# Patient Record
Sex: MALE | URBAN - METROPOLITAN AREA
[De-identification: names, ages, dates, MRNs, and addresses within clinical notes are randomized per-mention and may not be internally consistent; named-entity substitution may affect disease eponyms.]

---

## 2017-05-13 ENCOUNTER — EMERGENCY (EMERGENCY)
Facility: HOSPITAL | Age: 22
LOS: 1 days | Discharge: HOME | End: 2017-05-13
Attending: EMERGENCY MEDICINE | Admitting: EMERGENCY MEDICINE
Payer: COMMERCIAL

## 2017-05-13 VITALS
TEMPERATURE: 97 F | WEIGHT: 164.91 LBS | RESPIRATION RATE: 18 BRPM | HEART RATE: 66 BPM | DIASTOLIC BLOOD PRESSURE: 76 MMHG | OXYGEN SATURATION: 99 % | SYSTOLIC BLOOD PRESSURE: 121 MMHG

## 2017-05-13 VITALS
SYSTOLIC BLOOD PRESSURE: 135 MMHG | OXYGEN SATURATION: 100 % | RESPIRATION RATE: 18 BRPM | DIASTOLIC BLOOD PRESSURE: 81 MMHG | HEART RATE: 67 BPM

## 2017-05-13 DIAGNOSIS — S02.2XXA FRACTURE OF NASAL BONES, INITIAL ENCOUNTER FOR CLOSED FRACTURE: ICD-10-CM
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
## 2017-05-13 DIAGNOSIS — S01.21XA LACERATION WITHOUT FOREIGN BODY OF NOSE, INITIAL ENCOUNTER: ICD-10-CM

## 2017-05-13 DIAGNOSIS — R55 SYNCOPE AND COLLAPSE: ICD-10-CM
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
## 2017-05-13 LAB
ANION GAP SERPL CALC-SCNC: 5 MMOL/L — LOW (ref 9–16)
BASOPHILS NFR BLD AUTO: 0.5 % — SIGNIFICANT CHANGE UP (ref 0–2)
BUN SERPL-MCNC: 14 MG/DL — SIGNIFICANT CHANGE UP (ref 7–23)
CALCIUM SERPL-MCNC: 8.6 MG/DL — SIGNIFICANT CHANGE UP (ref 8.5–10.5)
CHLORIDE SERPL-SCNC: 106 MMOL/L — SIGNIFICANT CHANGE UP (ref 96–108)
CO2 SERPL-SCNC: 28 MMOL/L — SIGNIFICANT CHANGE UP (ref 22–31)
CREAT SERPL-MCNC: 1 MG/DL — SIGNIFICANT CHANGE UP (ref 0.5–1.3)
EOSINOPHIL NFR BLD AUTO: 1.4 % — SIGNIFICANT CHANGE UP (ref 0–6)
GLUCOSE SERPL-MCNC: 100 MG/DL — HIGH (ref 70–99)
HCT VFR BLD CALC: 42.9 % — SIGNIFICANT CHANGE UP (ref 39–50)
HGB BLD-MCNC: 15.2 G/DL — SIGNIFICANT CHANGE UP (ref 13–17)
LYMPHOCYTES # BLD AUTO: 40.2 % — SIGNIFICANT CHANGE UP (ref 13–44)
MCHC RBC-ENTMCNC: 30.4 PG — SIGNIFICANT CHANGE UP (ref 27–34)
MCHC RBC-ENTMCNC: 35.4 G/DL — SIGNIFICANT CHANGE UP (ref 32–36)
MCV RBC AUTO: 85.8 FL — SIGNIFICANT CHANGE UP (ref 80–100)
MONOCYTES NFR BLD AUTO: 7.6 % — SIGNIFICANT CHANGE UP (ref 2–14)
NEUTROPHILS NFR BLD AUTO: 50.3 % — SIGNIFICANT CHANGE UP (ref 43–77)
PLATELET # BLD AUTO: 156 K/UL — SIGNIFICANT CHANGE UP (ref 150–400)
POTASSIUM SERPL-MCNC: 4 MMOL/L — SIGNIFICANT CHANGE UP (ref 3.5–5.3)
POTASSIUM SERPL-SCNC: 4 MMOL/L — SIGNIFICANT CHANGE UP (ref 3.5–5.3)
RBC # BLD: 5 M/UL — SIGNIFICANT CHANGE UP (ref 4.2–5.8)
RBC # FLD: 12.5 % — SIGNIFICANT CHANGE UP (ref 10.3–16.9)
SODIUM SERPL-SCNC: 139 MMOL/L — SIGNIFICANT CHANGE UP (ref 135–145)
WBC # BLD: 6.4 K/UL — SIGNIFICANT CHANGE UP (ref 3.8–10.5)
WBC # FLD AUTO: 6.4 K/UL — SIGNIFICANT CHANGE UP (ref 3.8–10.5)

## 2017-05-13 PROCEDURE — A9585: CPT

## 2017-05-13 PROCEDURE — 99285 EMERGENCY DEPT VISIT HI MDM: CPT | Mod: 25

## 2017-05-13 PROCEDURE — 70553 MRI BRAIN STEM W/O & W/DYE: CPT

## 2017-05-13 PROCEDURE — 99284 EMERGENCY DEPT VISIT MOD MDM: CPT | Mod: 25

## 2017-05-13 PROCEDURE — 70450 CT HEAD/BRAIN W/O DYE: CPT

## 2017-05-13 PROCEDURE — 84484 ASSAY OF TROPONIN QUANT: CPT

## 2017-05-13 PROCEDURE — 21310: CPT

## 2017-05-13 PROCEDURE — 72125 CT NECK SPINE W/O DYE: CPT

## 2017-05-13 PROCEDURE — 80048 BASIC METABOLIC PNL TOTAL CA: CPT

## 2017-05-13 PROCEDURE — 82550 ASSAY OF CK (CPK): CPT

## 2017-05-13 PROCEDURE — 93005 ELECTROCARDIOGRAM TRACING: CPT | Mod: XU

## 2017-05-13 PROCEDURE — 21310: CPT | Mod: XU

## 2017-05-13 PROCEDURE — 70450 CT HEAD/BRAIN W/O DYE: CPT | Mod: 26

## 2017-05-13 PROCEDURE — 72125 CT NECK SPINE W/O DYE: CPT | Mod: 26

## 2017-05-13 PROCEDURE — 70553 MRI BRAIN STEM W/O & W/DYE: CPT | Mod: 26

## 2017-05-13 PROCEDURE — 36415 COLL VENOUS BLD VENIPUNCTURE: CPT

## 2017-05-13 PROCEDURE — 13151 CMPLX RPR E/N/E/L 1.1-2.5 CM: CPT | Mod: XU

## 2017-05-13 PROCEDURE — 82553 CREATINE MB FRACTION: CPT

## 2017-05-13 PROCEDURE — 85025 COMPLETE CBC W/AUTO DIFF WBC: CPT

## 2017-05-13 PROCEDURE — 93010 ELECTROCARDIOGRAM REPORT: CPT | Mod: 59

## 2017-05-13 RX ORDER — SODIUM CHLORIDE 9 MG/ML
1000 INJECTION INTRAMUSCULAR; INTRAVENOUS; SUBCUTANEOUS ONCE
Qty: 0 | Refills: 0 | Status: COMPLETED | OUTPATIENT
Start: 2017-05-13 | End: 2017-05-13

## 2017-05-13 RX ORDER — CEPHALEXIN 500 MG
500 CAPSULE ORAL
Qty: 0 | Refills: 0 | Status: DISCONTINUED | OUTPATIENT
Start: 2017-05-13 | End: 2017-05-17

## 2017-05-13 RX ORDER — CEPHALEXIN 500 MG
1 CAPSULE ORAL
Qty: 28 | Refills: 0 | OUTPATIENT
Start: 2017-05-13 | End: 2017-05-20

## 2017-05-13 RX ORDER — ACETAMINOPHEN 500 MG
650 TABLET ORAL ONCE
Qty: 0 | Refills: 0 | Status: COMPLETED | OUTPATIENT
Start: 2017-05-13 | End: 2017-05-13

## 2017-05-13 RX ADMIN — SODIUM CHLORIDE 1000 MILLILITER(S): 9 INJECTION INTRAMUSCULAR; INTRAVENOUS; SUBCUTANEOUS at 11:33

## 2017-05-13 RX ADMIN — Medication 500 MILLIGRAM(S): at 13:06

## 2017-05-13 RX ADMIN — Medication 650 MILLIGRAM(S): at 13:37

## 2017-05-13 RX ADMIN — Medication 500 MILLIGRAM(S): at 18:09

## 2017-05-13 NOTE — PROGRESS NOTE ADULT - ATTENDING COMMENTS
Plan  complex repair nasal lacerations, 2 cm  CREF open nasal fracture    Duricef  Tylenol w/ codeine  RTO 5 d

## 2017-05-13 NOTE — PROGRESS NOTE ADULT - ASSESSMENT
PE- system specific  transversely oriented full thickness laceration nasal dorsum  full thickness through procerus down to fracture  measured 1.5 cm    vertical laceration full thickness left nasal dorsum  full thickness through procerus down to fracture  measured 0.5 cm    right nasal bone depression  no septal hematoma    CT- displaced nasal fracture

## 2017-05-13 NOTE — PROGRESS NOTE ADULT - PROBLEM SELECTOR PLAN 1
CT Head reviewed w/ Dr. Longoria,  No neurosurgical intervention,  Consider Neurology evaluation for syncope and to determine if further imaging I warraned

## 2017-05-13 NOTE — ED ADULT NURSE REASSESSMENT NOTE - NS ED NURSE REASSESS COMMENT FT1
Pt lab results rvwd.  Pt's radiology rvwd.  Pt c-spine precautions still in place.  Pt refusing analgesia at this time.  Pending f/u CT scan.  Will continue to reassess and reevaluate.

## 2017-05-13 NOTE — ED PROVIDER NOTE - OBJECTIVE STATEMENT
22 yo male with apparent syncopal event in CCU 1 hr ago-occurred  while visiting his grandfather - pos LOC  with facial deformity of nose- lac  x 2 noted   no N/V  slight headache - no antecedant CP or SOB-  no visual complaints-

## 2017-05-13 NOTE — ED ADULT NURSE REASSESSMENT NOTE - NS ED NURSE REASSESS COMMENT FT1
C-spine precautions lifted per MD.  Collar and backboard removed.  Pt without complaint.  Pending plastics consult.

## 2017-05-13 NOTE — ED PROVIDER NOTE - MEDICAL DECISION MAKING DETAILS
syncope -nasal bone fracture-  A/O  x 3-  inc RBBB-  d/w cards  no acute intervention or need to admit- ? left cerebellar mass - await MRI/with and without

## 2017-05-13 NOTE — ED ADULT NURSE NOTE - OBJECTIVE STATEMENT
Pt w/o significant PMH brought to ED by rapid response team from [facility] while visiting family member pt experienced syncopal event.  Pt denies tripping or slipping, states he felt faint and then woke up on the floor.  Pt arrives in C-collar for c-spine injury precautions and on back board, pending clearance by provider.  Pt's FS is 103.  Pt denies palpitations, CP, paresthesias, hx of seizure or epilepsy, or recent subjective illness.  Pt states he recently finished finals at [email or web address] and has been operating on limited sleep.  Admits to eating breakfast this morning, but denies fluid consumption.  Pt states normal bowel and bladder functionality.  Pt pending labs and radiologic scan.  Mother at bedside.

## 2017-05-13 NOTE — PROGRESS NOTE ADULT - SUBJECTIVE AND OBJECTIVE BOX
HISTORY OF PRESENT ILLNESS: 21 year old male with PMH of ADHD is s/p syncope and fall at family member's bedside today in our ICU. He sustained trauma to his face/nose. He denies any history of syncope previously but does report some family history of syncope. He also reports feeling emotional at that time. Currently he reports some pain at the site of trauma but otherwise denies headaches, visual or hearing changes, extremity weakness or numbness. He is in a hard cervical collar pending CT Cspine clearance. He denies any other  recent illnesses or complaints. Denies drug use, smoking or ETOH abuse. Takes medication for his ADHD. CT Head is concerning for a small ring-like lesion in the left cerebellum that could be artifact. No associated edema or mass effect.    PAST MEDICAL & SURGICAL HISTORY:   h/o ADHD    FAMILY HISTORY: unclear history of syncope      SOCIAL HISTORY:  Tobacco Use: Denies  EtOH use: Denies  Substance: Denies    Allergies    No Known Allergies    Intolerances        REVIEW OF SYSTEMS  No other pertinent ROS or complaints.      MEDICATIONS:  Antibiotics:  cephalexin 500milliGRAM(s) Oral four times a day    Neuro:    Anticoagulation:    OTHER:    IVF:      Vital Signs Last 24 Hrs  T(C): 36.3, Max: 36.3 (05-13 @ 11:12)  T(F): 97.3, Max: 97.3 (05-13 @ 11:12)  HR: 66 (66 - 66)  BP: 121/76 (121/76 - 121/76)  BP(mean): --  RR: 18 (18 - 18)  SpO2: 99% (99% - 99%)    PHYSICAL EXAM:  Gen: NAD, AAOx3, WN/WD.  HEENT: +nasal abrasion w/ dressing. PERRL. EOMI. VFs grossly intact.  Neck: +collar  Neuro: CNs II-XII intact. 5/5 str x4 extremities. Sensation to LT intact. JANNA intact, no dysmetria. Following commands. GCS 15.      LABS:                        15.2   6.4   )-----------( 156      ( 13 May 2017 11:54 )             42.9     05-13    139  |  106  |  14  ----------------------------<  100<H>  4.0   |  28  |  1.00    Ca    8.6      13 May 2017 11:54          CULTURES:      RADIOLOGY & ADDITIONAL STUDIES: CT Head - Vague 0.7 cm ringlike structure in the left cerebellar hemisphere seen   only on one image that may represent artifact however an underlying   lesion cannot be completely excluded. If this further clinical concern,   CT with contrast is recommended.

## 2017-05-13 NOTE — ED PROVIDER NOTE - CARE PLAN
Principal Discharge DX:	Syncope  Secondary Diagnosis:	Laceration of face, multiple sites  Secondary Diagnosis:	Nasal bone fracture

## 2017-05-13 NOTE — ED ADULT NURSE REASSESSMENT NOTE - NS ED NURSE REASSESS COMMENT FT1
Pt's laceration sutured.  Pt tolerated procedure well.  Pt w/o complaint.  Pt pending MRI.  Will continue to reassess and reevaluate.

## 2017-05-13 NOTE — ED PROVIDER NOTE - PROGRESS NOTE DETAILS
? lesion left cerebellum will proceed with MRI with and without-  Dr Pitts aware  neurosurgery aware

## 2017-05-13 NOTE — PROGRESS NOTE ADULT - SUBJECTIVE AND OBJECTIVE BOX
21 year old male who had syncopal episode and fell.  Patient sustaining open, displaced nasal fracture and laceration.  Mom and patient complains of pain, bleeding and deformity    PMH- ADHD  PSH- neg  Meds- for ADHD  All- seasonal

## 2017-05-13 NOTE — ED ADULT TRIAGE NOTE - CHIEF COMPLAINT QUOTE
Rapid Response from  [place]  pt brought down by team s/p syncopal episode while visiting family member   c/o of headache and laceration to bridge of nose. aaox3.

## 2017-06-23 PROBLEM — Z00.00 ENCOUNTER FOR PREVENTIVE HEALTH EXAMINATION: Status: ACTIVE | Noted: 2017-06-23

## 2017-07-03 ENCOUNTER — NON-APPOINTMENT (OUTPATIENT)
Age: 22
End: 2017-07-03

## 2017-07-03 ENCOUNTER — APPOINTMENT (OUTPATIENT)
Dept: CARDIOLOGY | Facility: CLINIC | Age: 22
End: 2017-07-03

## 2017-07-03 VITALS
BODY MASS INDEX: 23.38 KG/M2 | SYSTOLIC BLOOD PRESSURE: 133 MMHG | WEIGHT: 167 LBS | HEIGHT: 71 IN | OXYGEN SATURATION: 100 % | RESPIRATION RATE: 17 BRPM | DIASTOLIC BLOOD PRESSURE: 89 MMHG | HEART RATE: 62 BPM

## 2017-07-03 VITALS — DIASTOLIC BLOOD PRESSURE: 80 MMHG | SYSTOLIC BLOOD PRESSURE: 130 MMHG

## 2017-07-03 DIAGNOSIS — R55 SYNCOPE AND COLLAPSE: ICD-10-CM

## 2017-07-03 DIAGNOSIS — F90.9 ATTENTION-DEFICIT HYPERACTIVITY DISORDER, UNSPECIFIED TYPE: ICD-10-CM

## 2017-07-03 DIAGNOSIS — R01.1 CARDIAC MURMUR, UNSPECIFIED: ICD-10-CM

## 2017-07-03 RX ORDER — LISDEXAMFETAMINE DIMESYLATE 10 MG/1
CAPSULE ORAL
Refills: 0 | Status: ACTIVE | COMMUNITY

## 2017-07-28 NOTE — ED PROVIDER NOTE - CROS ED EYES ALL NEG
It is going to take some time for the Blood sugar to get down.  He needs to continue to take the medication and log his blood sugars.  Make sure to drink plenty of WATER only to  hydrate.  I will see him Monday with his logs and we will discuss titration of Lantus then.  It will not be an immediate drop, and he would likely feel bad if his BS was in 100 range right now as he has been in the 400 range for some time with this Hg1Ac being 12 range.  We will bring it down slowly. I am glad he is checking and watching what he eats. He is okay for now.  This will be a titration period.    Dr. Mace negative...

## 2020-03-19 NOTE — ED PROVIDER NOTE - GASTROINTESTINAL NEGATIVE STATEMENT, MLM
Requested Prescriptions   Pending Prescriptions Disp Refills     zolpidem (AMBIEN) 10 MG tablet 30 tablet 0     Sig: TAKE 1 TABLET BY MOUTH AT BEDTIME AS NEEDED for SLEEP       There is no refill protocol information for this order              Last Written Prescription Date:  2/7/2020  Last Fill Quantity: 30,   # refills: 0  Last Office Visit: 2/27/2020 with Martha   Future Office visit:       Routing refill request to provider for review/approval because:  Drug not on the G, P or [facility] refill protocol or controlled substance     no abdominal pain, no bloating, no constipation, no diarrhea, no nausea and no vomiting.

## 2023-03-04 NOTE — ED ADULT NURSE NOTE - NS ED NURSE LEVEL OF CONSCIOUSNESS AFFECT
Appropriate/Calm
[email or web address]

## 2024-03-07 NOTE — ED PROVIDER NOTE - ENMT, MLM
Airway patent, Nasal mucosa clear. Mouth with normal mucosa. Throat has no vesicles, no oropharyngeal exudates and uvula is midline.
36.2